# Patient Record
Sex: FEMALE | Race: ASIAN
[De-identification: names, ages, dates, MRNs, and addresses within clinical notes are randomized per-mention and may not be internally consistent; named-entity substitution may affect disease eponyms.]

---

## 2020-07-06 NOTE — HP
DATE OF ADMISSION: 07/08/2020

 

DATE OF DICTATION:  03/26/2020

 

 

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

abdominal discomfort, usually beginning in her epigastrium with radiation to both

right and left upper quadrants and to her back.

 

Patient's recent ultrasound of the abdomen completed on January 9, 2020, had

demonstrated a 2.4-cm gallstone.

 

On close questioning, the patient has a definite fatty food intolerance by history. 

There has been no unintentional weight loss.  Other members of her family who have

had gallbladder issues.  Although the patient feels that she may have had some

yellowishness to her eyes in the past, that has not been recent.

 

Patient has no past medical history otherwise.  No history of hypertension, heart

disease, diabetes, respiratory, renal, or hepatic insufficiency.

 

PAST SURGICAL HISTORY:  Nil.

 

ALLERGIES:  None known.

 

CURRENT MEDICATIONS:  None.

 

SOCIAL HISTORY:  Negative tobacco.  Negative alcohol.

 

FAMILY HISTORY:  Mother history of a benign brain tumor.  No other family history.

 

REVIEW OF SYSTEMS:  Nil.

 

PHYSICAL EXAMINATION:

Abdomen:  Flat, soft, nontender with no palpable abnormality.

 

IMPRESSION:  Chronic cholecystitis/cholelithiasis.

 

PLAN:  Laparoscopic cholecystectomy/possible open cholecystectomy.

 

Indications, alternatives, possible complications reviewed.  Consent obtained.

 

Patient to be seen preoperatively by Dr. Vu Mix.  Please refer to his notes

for those medical details.

 

 

VANGIE MADRIGAL M.D.

 

MONSE/[id number]

DD: 03/26/2020 10:43

DT: 03/26/2020 11:20

Job #:  [id number]

cc:  Vu Mix MD

## 2020-07-08 ENCOUNTER — HOSPITAL ENCOUNTER (OUTPATIENT)
Dept: HOSPITAL 74 - FM/S | Age: 50
LOS: 1 days | Discharge: HOME | End: 2020-07-09
Attending: SURGERY
Payer: COMMERCIAL

## 2020-07-08 VITALS — BODY MASS INDEX: 21.2 KG/M2

## 2020-07-08 DIAGNOSIS — K80.10: Primary | ICD-10-CM

## 2020-07-08 PROCEDURE — 0FT44ZZ RESECTION OF GALLBLADDER, PERCUTANEOUS ENDOSCOPIC APPROACH: ICD-10-PCS | Performed by: SURGERY

## 2020-07-08 RX ADMIN — FAMOTIDINE SCH MLS/HR: 20 INJECTION, SOLUTION INTRAVENOUS at 21:45

## 2020-07-08 NOTE — OP
DATE OF OPERATION:  07/08/2020

 

PREOPERATIVE DIAGNOSIS:  Chronic cholecystitis/cholelithiasis.

 

POSTOPERATIVE DIAGNOSIS:  Chronic cholecystitis/cholelithiasis.

 

PROCEDURE:  Laparoscopic cholecystectomy.  

 

OPERATING SURGEON:  Vangie Bustos MD 

 

FIRST ASSISTANT:  Adam Maier MD

 

ANESTHESIA:  Donya Lennon MD (general)

 

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

gallbladder as management of symptomatic cholelithiasis.  Indications, 
alternatives,

possible complications reviewed.  Consent obtained.  

 

DESCRIPTION OF PROCEDURE:  With the patient in the supine position, under 
general

anesthesia, the abdomen was prepped and draped in sterile fashion using

chlorhexidine.  

 

A small infraumbilical incision was made through which a Veress needle was 
placed

into the abdominal cavity.  The abdominal cavity was insufflated to an adequate

pressure and volume using CO2 gas.  Veress needle was removed.  An 11-mm trocar 
port

placed through the infraumbilical wound into the abdominal cavity.  

 

The abdominal cavity pressure was maintained at 15.  

 

The camera lens passed through the infraumbilical port site, and the 
intraabdominal

cavity visualized.  Under direct vision, two 5-mm right anterolateral ports were

placed through which clamps were passed to maintain traction on the gallbladder 
and

aid in the dissection.  An 11-mm port was placed in the epigastrium, through 
which

the operating instruments were passed.  

 

Limited exploration of the abdomen revealed several adhesions about the 
gallbladder,

otherwise no significant findings encountered.  

 

These adhesions about the gallbladder were taken down, exposing the entire

gallbladder and hepatoduodenal ligament.  

 

The peritoneum and hepatoduodenal ligament was incised.  The cystic duct was

identified.  The cystic duct/bile duct junction was noted.  The cystic duct was

clipped proximally and distally and divided.  The adjacent cystic artery was

identified, clipped, and divided as well.  

 

The gallbladder was then removed from the gallbladder bed, lysing its peritoneal

attachment using the electrocautery.  Prior to complete disconnection, the bed 
was

inspected, and afterwards adequate hemostasis assured.  The gallbladder was 
freed.  

 

All ports were then removed under direct vision.  No bleeding identified.  The

gallbladder was passed through the infraumbilical wound under direct vision and

delivered uneventfully.  

The pneumoperitoneum was allowed to escape.  The fascia at the infraumbilical 
wound

site was closed using interrupted 0 Vicryl sutures.  All skin wounds were closed

using subcuticular 4-0 Biosyn sutures.  

 

Needle, instrument counts were correct.  Estimated blood loss minimal.  Specimen

gallbladder.  Drains none.  

 

Patient tolerated procedure.  Procedure was terminated.   

 

 

VANGIE BUSTOS M.D.

 

[id number]

DD: 07/08/2020 11:39

DT: 07/08/2020 20:00

Job #:  [id number]

MTDD

## 2020-07-08 NOTE — HP
DATE OF ADMISSION:  07/08/2020

 

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

removal of her gallbladder, possible open removal, as management of biliary

colic/chronic cholecystitis and cholelithiasis.  

 

For the past couple years the patient has been experiencing intermittent bouts of

upper abdominal discomfort, generally postprandial, usually beginning in the

epigastrium but radiation to both right and left upper quadrants and to her back.  

 

Ultrasound evaluation of the abdomen last completed in January of this year

demonstrated cholelithiasis.  

 

On questioning, the patient has a definite fatty food intolerance by history.  There

is no unintentional weight loss.  Patient feels that she may have noticed some yellow

coloration to her eyes in the past, but not recently.  

 

PAST MEDICAL HISTORY:  No past medical history otherwise.  No history of

hypertension, heart disease, diabetes, respiratory nor hepatic insufficiency.  

 

PAST SURGICAL HISTORY:  Nil.

 

ALLERGIES:  None known. 

 

CURRENT MEDICATIONS:  None.

 

SOCIAL HISTORY:  Negative tobacco, negative alcohol.

 

FAMILY HISTORY:  Mother, history of benign brain tumor.  No other family history.  

 

REVIEW OF SYSTEMS:  Nil.

 

PHYSICAL EXAMINATION:  

General:  Awake, alert, in no acute distress.  

Abdomen:  Flat, soft, nontender, no palpable abnormalities.

 

IMPRESSION:  Chronic cholecystitis/cholelithiasis.

 

PLAN:  Laparoscopic cholecystectomy, possible open cholecystectomy.  

 

Indications, alternatives, possible complications were reviewed.  Consent obtained.  

 

Please see Dr. Vu Mix's preoperative notes that refer to the patient's medical

details. 

 

 

VANGIE MADRIGAL M.D.

 

[id number]

DD: 07/08/2020 10:43

DT: 07/08/2020 11:06

Job #:  [id number]

cc:  Vu Mix MD

## 2020-07-09 VITALS — TEMPERATURE: 98.8 F | SYSTOLIC BLOOD PRESSURE: 110 MMHG | DIASTOLIC BLOOD PRESSURE: 62 MMHG | HEART RATE: 61 BPM

## 2020-07-09 RX ADMIN — FAMOTIDINE SCH MLS/HR: 20 INJECTION, SOLUTION INTRAVENOUS at 10:22

## 2020-07-10 NOTE — PATH
Surgical Pathology Report



Patient Name:  CARLITA KEENAN

Accession #:  

Med. Rec. #:  [id number]                                                        

   /Age/Gender:  1970 (Age: 49) / F

Account:  [id number]                                                          

             Location: [facility] MED[facility]

Taken:  2020

Received:  2020

Reported:  7/10/2020

Physicians:  Dimitris Bustos M.D.

  



Specimen(s) Received

 GALLBLADDER & STONES 





Clinical History

Cholecystitis, cholelithiasis







Final Diagnosis

GALLBLADDER, CHOLECYSTECTOMY:

CHRONIC CHOLECYSTITIS WITH CHOLESTEROLOSIS.

CHOLELITHIASIS.





***Electronically Signed***

Blanca Botello M.D.





Gross Description

Received in formalin, labeled "gallbladder," is a 7.0 x 2.3 x 2.2 cm.

gallbladder with a 0.2 cm. in length portion of cystic duct attached. The outer

surface is tan green and varies from smooth to shaggy. The lumen contains green,

tenacious bile as well as a single green, irregular cholelith measuring 2.0 cm

in greatest dimension. The mucosa is green and velvety with gold cholesterol

stippling. The wall of the gallbladder averages 0.1 cm. in thickness.

Representative sections are submitted in one cassette.  

/2020



[place]

## 2021-04-22 ENCOUNTER — HOSPITAL ENCOUNTER (OUTPATIENT)
Dept: HOSPITAL 74 - JRADIR | Age: 51
Discharge: HOME | End: 2021-04-22
Attending: INTERNAL MEDICINE
Payer: COMMERCIAL

## 2021-04-22 VITALS — HEART RATE: 60 BPM

## 2021-04-22 VITALS — SYSTOLIC BLOOD PRESSURE: 110 MMHG | DIASTOLIC BLOOD PRESSURE: 60 MMHG

## 2021-04-22 VITALS — TEMPERATURE: 98.2 F

## 2021-04-22 VITALS — BODY MASS INDEX: 21.1 KG/M2

## 2021-04-22 DIAGNOSIS — K76.0: Primary | ICD-10-CM

## 2021-04-22 PROCEDURE — 0FB03ZX EXCISION OF LIVER, PERCUTANEOUS APPROACH, DIAGNOSTIC: ICD-10-PCS | Performed by: RADIOLOGY

## 2023-05-17 ENCOUNTER — APPOINTMENT (OUTPATIENT)
Dept: OPHTHALMOLOGY | Facility: CLINIC | Age: 53
End: 2023-05-17
Payer: COMMERCIAL

## 2023-05-17 ENCOUNTER — NON-APPOINTMENT (OUTPATIENT)
Age: 53
End: 2023-05-17

## 2023-05-17 PROCEDURE — 76514 ECHO EXAM OF EYE THICKNESS: CPT

## 2023-05-17 PROCEDURE — 92020 GONIOSCOPY: CPT

## 2023-05-17 PROCEDURE — 92004 COMPRE OPH EXAM NEW PT 1/>: CPT

## 2023-05-17 PROCEDURE — 92133 CPTRZD OPH DX IMG PST SGM ON: CPT

## 2024-06-03 ENCOUNTER — APPOINTMENT (OUTPATIENT)
Dept: OPHTHALMOLOGY | Facility: CLINIC | Age: 54
End: 2024-06-03
Payer: SELF-PAY

## 2024-06-03 ENCOUNTER — NON-APPOINTMENT (OUTPATIENT)
Age: 54
End: 2024-06-03

## 2024-06-03 ENCOUNTER — APPOINTMENT (OUTPATIENT)
Dept: OPHTHALMOLOGY | Facility: CLINIC | Age: 54
End: 2024-06-03
Payer: COMMERCIAL

## 2024-06-03 PROCEDURE — 92014 COMPRE OPH EXAM EST PT 1/>: CPT

## 2024-06-03 PROCEDURE — 76514 ECHO EXAM OF EYE THICKNESS: CPT

## 2024-06-03 PROCEDURE — 92015 DETERMINE REFRACTIVE STATE: CPT

## 2024-06-03 PROCEDURE — 92134 CPTRZ OPH DX IMG PST SGM RTA: CPT

## 2024-06-03 PROCEDURE — 92020 GONIOSCOPY: CPT

## 2025-06-04 ENCOUNTER — NON-APPOINTMENT (OUTPATIENT)
Age: 55
End: 2025-06-04

## 2025-06-04 ENCOUNTER — APPOINTMENT (OUTPATIENT)
Facility: CLINIC | Age: 55
End: 2025-06-04
Payer: COMMERCIAL

## 2025-06-04 PROCEDURE — 92014 COMPRE OPH EXAM EST PT 1/>: CPT

## 2025-06-04 PROCEDURE — 92020 GONIOSCOPY: CPT

## 2025-06-04 PROCEDURE — 76514 ECHO EXAM OF EYE THICKNESS: CPT
